# Patient Record
Sex: MALE | Race: WHITE | NOT HISPANIC OR LATINO | Employment: FULL TIME | URBAN - METROPOLITAN AREA
[De-identification: names, ages, dates, MRNs, and addresses within clinical notes are randomized per-mention and may not be internally consistent; named-entity substitution may affect disease eponyms.]

---

## 2017-01-28 ENCOUNTER — OFFICE VISIT (OUTPATIENT)
Dept: URGENT CARE | Facility: PHYSICIAN GROUP | Age: 47
End: 2017-01-28
Payer: OTHER MISCELLANEOUS

## 2017-01-28 VITALS
RESPIRATION RATE: 14 BRPM | BODY MASS INDEX: 42.66 KG/M2 | HEIGHT: 72 IN | HEART RATE: 90 BPM | OXYGEN SATURATION: 92 % | TEMPERATURE: 98.6 F | WEIGHT: 315 LBS | DIASTOLIC BLOOD PRESSURE: 92 MMHG | SYSTOLIC BLOOD PRESSURE: 134 MMHG

## 2017-01-28 DIAGNOSIS — J45.901 ASTHMA EXACERBATION, MILD: ICD-10-CM

## 2017-01-28 DIAGNOSIS — J06.9 UPPER RESPIRATORY TRACT INFECTION, UNSPECIFIED TYPE: ICD-10-CM

## 2017-01-28 PROCEDURE — 99203 OFFICE O/P NEW LOW 30 MIN: CPT | Performed by: PHYSICIAN ASSISTANT

## 2017-01-28 RX ORDER — IPRATROPIUM BROMIDE AND ALBUTEROL SULFATE 2.5; .5 MG/3ML; MG/3ML
3 SOLUTION RESPIRATORY (INHALATION) ONCE
Status: COMPLETED | OUTPATIENT
Start: 2017-01-28 | End: 2017-01-28

## 2017-01-28 RX ORDER — METHYLPREDNISOLONE 4 MG/1
TABLET ORAL
Qty: 1 KIT | Refills: 0 | Status: SHIPPED | OUTPATIENT
Start: 2017-01-28

## 2017-01-28 RX ORDER — MONTELUKAST SODIUM 10 MG/1
10 TABLET ORAL DAILY
Qty: 7 TAB | Refills: 0 | Status: SHIPPED | OUTPATIENT
Start: 2017-01-28 | End: 2017-02-04

## 2017-01-28 RX ADMIN — IPRATROPIUM BROMIDE AND ALBUTEROL SULFATE 3 ML: 2.5; .5 SOLUTION RESPIRATORY (INHALATION) at 16:38

## 2017-01-28 ASSESSMENT — ENCOUNTER SYMPTOMS
APPETITE CHANGE: 0
SPUTUM PRODUCTION: 0
FEVER: 0
WHEEZING: 1
CHEST TIGHTNESS: 1
CHILLS: 0
DIFFICULTY BREATHING: 0
HEADACHES: 0
ORTHOPNEA: 0
NECK PAIN: 0
DYSPNEA ON EXERTION: 1
EYE DISCHARGE: 0
PALPITATIONS: 0
TROUBLE SWALLOWING: 0
TINGLING: 0
DIZZINESS: 0
FREQUENT THROAT CLEARING: 1
VOMITING: 0
EYE REDNESS: 0
MYALGIAS: 0
COUGH: 1
HEARTBURN: 0
HOARSE VOICE: 1
ORTHOPNEA: 0
PND: 0
HEMOPTYSIS: 0
ABDOMINAL PAIN: 0
SORE THROAT: 0
RHINORRHEA: 0
SHORTNESS OF BREATH: 0

## 2017-01-28 ASSESSMENT — COPD QUESTIONNAIRES: COPD: 0

## 2017-01-29 NOTE — PROGRESS NOTES
Subjective:      Cyrus Zaidi is a 46 y.o. male who presents with Cough          Pt is 45 y/o male who presents with cough and wheezing for the last week. Pt is a  here from [facility] and reports that recently his wheezing has gotten worse. He does have a neb. In his cab, however he is only doing this daily (Duoneb). He also has rescue inhaler as well- he is using this several times daily. He denies any current SOB at rest, however only when he exerts himself as he will then start coughing. He reports hx of same and has needed to be on steroids in the past. He denies any hx of PNE, or hospitalizations for same.   Cough  Associated symptoms include postnasal drip and wheezing. Pertinent negatives include no chest pain, chills, ear congestion, ear pain, eye redness, fever, headaches, heartburn, hemoptysis, myalgias, rash, rhinorrhea, sore throat or shortness of breath. His past medical history is significant for asthma. There is no history of bronchiectasis, bronchitis or COPD.   Asthma  He complains of chest tightness, cough, frequent throat clearing, hoarse voice and wheezing. There is no difficulty breathing, hemoptysis, shortness of breath or sputum production. This is a recurrent problem. Episode onset: 5-6 days ago. The problem occurs daily. The problem has been waxing and waning. The cough is non-productive. Associated symptoms include dyspnea on exertion and postnasal drip. Pertinent negatives include no appetite change, chest pain, ear congestion, ear pain, fever, headaches, heartburn, malaise/fatigue, myalgias, orthopnea, PND, rhinorrhea, sneezing, sore throat or trouble swallowing. His symptoms are aggravated by change in weather. His symptoms are alleviated by beta-agonist. He reports moderate improvement on treatment. His past medical history is significant for asthma. There is no history of bronchiectasis, bronchitis or COPD.   Of note he also reports that his Singulair was being mailed to  him, but won't get here til next week.     Review of Systems   Constitutional: Negative for fever, chills, malaise/fatigue and appetite change.   HENT: Positive for congestion, hoarse voice and postnasal drip. Negative for ear pain, rhinorrhea, sneezing, sore throat and trouble swallowing.    Eyes: Negative for discharge and redness.   Respiratory: Positive for cough and wheezing. Negative for hemoptysis, sputum production and shortness of breath.    Cardiovascular: Positive for dyspnea on exertion. Negative for chest pain, palpitations, orthopnea and PND.   Gastrointestinal: Negative for heartburn, vomiting and abdominal pain.   Genitourinary: Negative for dysuria and urgency.   Musculoskeletal: Negative for myalgias and neck pain.   Skin: Negative for itching and rash.   Neurological: Negative for dizziness, tingling and headaches.          Objective:     /92 mmHg  Pulse 90  Temp(Src) 37 °C (98.6 °F)  Resp 14  Ht 1.829 m (6')  Wt 149.687 kg (330 lb)  BMI 44.75 kg/m2  SpO2 92%   PMH:  has no past medical history on file.  MEDS:   Current outpatient prescriptions:   •  albuterol (PROVENTIL) 2.5 mg/0.5 mL Nebu Soln, by Nebulization route ONCE (RT)., Disp: , Rfl:   •  MethylPREDNISolone (MEDROL DOSEPAK) 4 MG Tablet Therapy Pack, UAD, Disp: 1 Kit, Rfl: 0  •  montelukast (SINGULAIR) 10 MG Tab, Take 1 Tab by mouth every day for 7 days., Disp: 7 Tab, Rfl: 0  ALLERGIES: Not on File  SURGHX: History reviewed. No pertinent past surgical history.  SOCHX:  reports that he has never smoked. He does not have any smokeless tobacco history on file.  FH: Family history was reviewed, no pertinent findings to report    Physical Exam   Constitutional: He is oriented to person, place, and time. He appears well-developed and well-nourished.   HENT:   Head: Normocephalic and atraumatic.   Right Ear: External ear normal.   Left Ear: External ear normal.   Mouth/Throat: Oropharynx is clear and moist.    Boggy nasal turbinates.     Neck: Normal range of motion. Neck supple. No JVD present.   Cardiovascular: Normal rate and regular rhythm.    No murmur heard.  Pulmonary/Chest: Effort normal. He has wheezes.   Musculoskeletal: Normal range of motion. He exhibits no tenderness.   Lymphadenopathy:     He has no cervical adenopathy.   Neurological: He is alert and oriented to person, place, and time.   Skin: Skin is warm. Rash noted.   Psychiatric: He has a normal mood and affect. His behavior is normal.   Vitals reviewed.              Assessment/Plan:     1. Asthma exacerbation, mild  - ipratropium-albuterol (DUONEB) nebulizer solution 3 mL; 3 mL by Nebulization route Once.  - MethylPREDNISolone (MEDROL DOSEPAK) 4 MG Tablet Therapy Pack; UAD  Dispense: 1 Kit; Refill: 0  - montelukast (SINGULAIR) 10 MG Tab; Take 1 Tab by mouth every day for 7 days.  Dispense: 7 Tab; Refill: 0    2. Upper respiratory tract infection, unspecified type    Significant improvement with air movement after Duoneb- mild exp. Wheeze left upper lung field- with noted subjective improvements per pt.   Will treat asthma today- as pt. Is without any Rhonchi and symptoms appear viral in nature worsened by being in and out of cold/wet weather. Pt is to continue with neb. However increase to TID or QID.   Patient given precautionary s/sx that mandate immediate follow up and evaluation in the ED. Advised of risks of not doing so.    DDX, Supportive care, and indications for immediate follow-up discussed with patient.    Instructed to return to clinic or nearest emergency department if we are not available for any change in condition, further concerns, or worsening of symptoms.    The patient demonstrated a good understanding and agreed with the treatment plan.